# Patient Record
Sex: FEMALE | Race: OTHER | Employment: OTHER | URBAN - METROPOLITAN AREA
[De-identification: names, ages, dates, MRNs, and addresses within clinical notes are randomized per-mention and may not be internally consistent; named-entity substitution may affect disease eponyms.]

---

## 2019-09-13 NOTE — PATIENT DISCUSSION
(N51.851) Keratoconjunct sicca, not specified as Sjogren's, bilateral - Assesment : Examination revealed Dry Eye Syndrome - Plan : Monitor for changes. Advised patient to call our office with decreased vision or increased symptoms.   RTC 6 month dilation OD only

## 2019-09-13 NOTE — PATIENT DISCUSSION
(H35.61) Retinal hemorrhage, right eye - Assesment : Examination revealed a solitary dot retinal hemorrhage. - Plan : Monitor for changes

## 2020-06-25 NOTE — PATIENT DISCUSSION
Recommended warm compresses. QHS OU for a month then twice weekly for maintenance. CPM ATs 3-4 times daily OU.

## 2020-09-30 NOTE — PATIENT DISCUSSION
Explained chronic condition and handout given. Continue regular use of ATs and hot compress and lid hygiene routine.

## 2020-09-30 NOTE — PATIENT DISCUSSION
Monitor. Recommended regular use of ATs 3-4 times per day and prn. Refresh Niko-3 sample given today.

## 2022-02-02 ENCOUNTER — NEW PATIENT (OUTPATIENT)
Dept: URBAN - METROPOLITAN AREA CLINIC 36 | Facility: CLINIC | Age: 57
End: 2022-02-02

## 2022-02-02 DIAGNOSIS — H52.7: ICD-10-CM

## 2022-02-02 DIAGNOSIS — Z98.890: ICD-10-CM

## 2022-02-02 DIAGNOSIS — H04.123: ICD-10-CM

## 2022-02-02 PROCEDURE — 92004 COMPRE OPH EXAM NEW PT 1/>: CPT

## 2022-02-02 PROCEDURE — 92015 DETERMINE REFRACTIVE STATE: CPT

## 2022-02-02 ASSESSMENT — VISUAL ACUITY
OD_CC: J2
OS_SC: 20/30-1
OD_SC: 20/20
OS_CC: J2
OS_SC: J10
OD_SC: J10

## 2022-02-02 ASSESSMENT — TONOMETRY
OD_IOP_MMHG: 16
OS_IOP_MMHG: 16

## 2023-09-12 ENCOUNTER — COMPREHENSIVE EXAM (OUTPATIENT)
Dept: URBAN - METROPOLITAN AREA CLINIC 36 | Facility: CLINIC | Age: 58
End: 2023-09-12

## 2023-09-12 DIAGNOSIS — H52.7: ICD-10-CM

## 2023-09-12 DIAGNOSIS — Z98.890: ICD-10-CM

## 2023-09-12 DIAGNOSIS — H04.123: ICD-10-CM

## 2023-09-12 PROCEDURE — 92014 COMPRE OPH EXAM EST PT 1/>: CPT

## 2023-09-12 PROCEDURE — 92015 DETERMINE REFRACTIVE STATE: CPT

## 2023-09-12 ASSESSMENT — TONOMETRY
OD_IOP_MMHG: 16
OS_IOP_MMHG: 17

## 2023-09-12 ASSESSMENT — VISUAL ACUITY
OD_SC: J>12
OS_SC: 20/30
OS_SC: J>12
OD_SC: 20/30
OD_CC: J3
OS_CC: J4

## 2024-10-02 ENCOUNTER — COMPREHENSIVE EXAM (OUTPATIENT)
Dept: URBAN - METROPOLITAN AREA CLINIC 36 | Facility: CLINIC | Age: 59
End: 2024-10-02

## 2024-10-02 DIAGNOSIS — H04.123: ICD-10-CM

## 2024-10-02 DIAGNOSIS — Z98.890: ICD-10-CM

## 2024-10-02 DIAGNOSIS — H52.7: ICD-10-CM

## 2024-10-02 PROCEDURE — 92015 DETERMINE REFRACTIVE STATE: CPT

## 2024-10-02 PROCEDURE — 92014 COMPRE OPH EXAM EST PT 1/>: CPT
